# Patient Record
Sex: MALE | Race: WHITE | NOT HISPANIC OR LATINO | Employment: OTHER | RURAL
[De-identification: names, ages, dates, MRNs, and addresses within clinical notes are randomized per-mention and may not be internally consistent; named-entity substitution may affect disease eponyms.]

---

## 2020-11-25 ENCOUNTER — OFFICE VISIT (OUTPATIENT)
Dept: OTOLARYNGOLOGY | Facility: CLINIC | Age: 70
End: 2020-11-25

## 2020-11-25 VITALS — TEMPERATURE: 98.8 F | WEIGHT: 172 LBS | BODY MASS INDEX: 24.62 KG/M2 | HEIGHT: 70 IN

## 2020-11-25 DIAGNOSIS — H68.009 EUSTACHIAN CATARRH, UNSPECIFIED LATERALITY: ICD-10-CM

## 2020-11-25 DIAGNOSIS — L40.9 PSORIASIS: ICD-10-CM

## 2020-11-25 DIAGNOSIS — H61.23 BILATERAL IMPACTED CERUMEN: Primary | ICD-10-CM

## 2020-11-25 DIAGNOSIS — J34.2 NASAL SEPTAL DEVIATION: ICD-10-CM

## 2020-11-25 PROCEDURE — 99204 OFFICE O/P NEW MOD 45 MIN: CPT | Performed by: OTOLARYNGOLOGY

## 2020-11-25 PROCEDURE — 69210 REMOVE IMPACTED EAR WAX UNI: CPT | Performed by: OTOLARYNGOLOGY

## 2020-11-25 RX ORDER — OMEPRAZOLE 40 MG/1
40 CAPSULE, DELAYED RELEASE ORAL DAILY
COMMUNITY

## 2020-11-25 RX ORDER — LOSARTAN POTASSIUM 25 MG/1
25 TABLET ORAL DAILY
COMMUNITY
End: 2021-01-06 | Stop reason: ALTCHOICE

## 2020-11-25 RX ORDER — ASPIRIN 81 MG/1
81 TABLET ORAL DAILY
COMMUNITY

## 2020-11-25 RX ORDER — BETAMETHASONE DIPROPIONATE 0.5 MG/G
LOTION TOPICAL 2 TIMES DAILY
Qty: 60 ML | Refills: 6 | Status: SHIPPED | OUTPATIENT
Start: 2020-11-25 | End: 2020-11-25 | Stop reason: SDUPTHER

## 2020-11-25 RX ORDER — BETAMETHASONE DIPROPIONATE 0.5 MG/G
LOTION TOPICAL
Qty: 60 ML | Refills: 1 | Status: SHIPPED | OUTPATIENT
Start: 2020-11-25

## 2020-11-25 RX ORDER — MONTELUKAST SODIUM 10 MG/1
10 TABLET ORAL DAILY
COMMUNITY

## 2020-11-25 RX ORDER — OLOPATADINE HYDROCHLORIDE 665 UG/1
2 SPRAY NASAL 2 TIMES DAILY
Qty: 30 G | Refills: 6 | Status: SHIPPED | OUTPATIENT
Start: 2020-11-25 | End: 2022-01-14

## 2020-11-25 RX ORDER — SIMVASTATIN 20 MG
20 TABLET ORAL NIGHTLY
COMMUNITY

## 2020-11-25 NOTE — PATIENT INSTRUCTIONS
MyPlate from USDA    MyPlate is an outline of a general healthy diet based on the 2010 Dietary Guidelines for Americans, from the U.[place]. Department of Agriculture (USDA). It sets guidelines for how much food you should eat from each food group based on your age, sex, and level of physical activity.  What are tips for following MyPlate?  To follow MyPlate recommendations:  · Eat a wide variety of fruits and vegetables, grains, and protein foods.  · Serve smaller portions and eat less food throughout the day.  · Limit portion sizes to avoid overeating.  · Enjoy your food.  · Get at least 150 minutes of exercise every week. This is about 30 minutes each day, 5 or more days per week.  It can be difficult to have every meal look like MyPlate. Think about MyPlate as eating guidelines for an entire day, rather than each individual meal.  Fruits and vegetables  · Make half of your plate fruits and vegetables.  · Eat many different colors of fruits and vegetables each day.  · For a 2,000 calorie daily food plan, eat:  ? 2½ cups of vegetables every day.  ? 2 cups of fruit every day.  · 1 cup is equal to:  ? 1 cup raw or cooked vegetables.  ? 1 cup raw fruit.  ? 1 medium-sized orange, apple, or banana.  ? 1 cup 100% fruit or vegetable juice.  ? 2 cups raw leafy greens, such as lettuce, spinach, or kale.  ? ½ cup dried fruit.  Grains  · One fourth of your plate should be grains.  · Make at least half of the grains you eat each day whole grains.  · For a 2,000 calorie daily food plan, eat 6 oz of grains every day.  · 1 oz is equal to:  ? 1 slice bread.  ? 1 cup cereal.  ? ½ cup cooked rice, cereal, or pasta.  Protein  · One fourth of your plate should be protein.  · Eat a wide variety of protein foods, including meat, poultry, fish, eggs, beans, nuts, and tofu.  · For a 2,000 calorie daily food plan, eat 5½ oz of protein every day.  · 1 oz is equal to:  ? 1 oz meat, poultry, or fish.  ? ¼ cup cooked beans.  ? 1 egg.  ? ½ oz nuts  or seeds.  ? 1 Tbsp peanut butter.  Dairy  · Drink fat-free or low-fat (1%) milk.  · Eat or drink dairy as a side to meals.  · For a 2,000 calorie daily food plan, eat or drink 3 cups of dairy every day.  · 1 cup is equal to:  ? 1 cup milk, yogurt, cottage cheese, or soy milk (soy beverage).  ? 2 oz processed cheese.  ? 1½ oz natural cheese.  Fats, oils, salt, and sugars  · Only small amounts of oils are recommended.  · Avoid foods that are high in calories and low in nutritional value (empty calories), like foods high in fat or added sugars.  · Choose foods that are low in salt (sodium). Choose foods that have less than 140 milligrams (mg) of sodium per serving.  · Drink water instead of sugary drinks. Drink enough water each day to keep your urine pale yellow.  Where to find support  · Work with your health care provider or a nutrition specialist (dietitian) to develop a customized eating plan that is right for you.  · Download an pankaj (mobile application) to help you track your daily food intake.  Where to find more information  · Go to ChooseMyPlate.gov for more information.  Summary  · MyPlate is a general guideline for healthy eating from the USDA. It is based on the 2010 Dietary Guidelines for Americans.  · In general, fruits and vegetables should take up ½ of your plate, grains should take up ¼ of your plate, and protein should take up ¼ of your plate.  This information is not intended to replace advice given to you by your health care provider. Make sure you discuss any questions you have with your health care provider.  Document Revised: 05/21/2020 Document Reviewed: 03/19/2018  Elsevier Patient Education © 2020 [employer] Inc.

## 2020-11-25 NOTE — PROGRESS NOTES
Subjective   Beto Bhatia is a 70 y.o. male.   Multiple ear and nasal complaints  History of Present Illness   Patient says he has had every 2 to 3-month ear cleanings by multiple ENTs and is concerned about why that happens he is also having ear stopped up and popping crackling he has known allergy symptoms sees an allergist is never been treated for psoriasis or is had questions about his dental issues which are referred him to his dentist about has not had a recent CT of the sinuses though he has not passed takes multiple allergy medicines  He has mild hearing loss and popping fullness that comes and goes with his ears he says ears cleaned out suction and wax removed by multiple techniques    The following portions of the patient's history were reviewed and updated as appropriate: allergies, current medications, past family history, past medical history, past social history, past surgical history and problem list.      Beto Bhatia reports that he has quit smoking. He has never used smokeless tobacco. He reports previous alcohol use. He reports that he does not use drugs.  Patient is not a tobacco user and has been counseled for use of tobacco products    Family History   Problem Relation Age of Onset   • Cancer Other    • Heart disease Other          Current Outpatient Medications:   •  aspirin 81 MG EC tablet, Take 81 mg by mouth Daily., Disp: , Rfl:   •  losartan (COZAAR) 25 MG tablet, Take 25 mg by mouth Daily., Disp: , Rfl:   •  montelukast (Singulair) 10 MG tablet, Take 10 mg by mouth Daily., Disp: , Rfl:   •  Multiple Vitamin (MULTIVITAMINS PO), Take 1 tablet by mouth Daily., Disp: , Rfl:   •  omeprazole (priLOSEC) 40 MG capsule, Take 40 mg by mouth Daily. 11/25/2020 - Patient States He Utilizes Omeprazole 40 MG Tablets 3 Days Per Week At Current Time, Disp: , Rfl:   •  simvastatin (ZOCOR) 20 MG tablet, Take 20 mg by mouth Every Night., Disp: , Rfl:   •  betamethasone dipropionate  (DIPROLENE) 0.05 % lotion, Apply  topically to the appropriate area as directed 2 (Two) Times a Day., Disp: 60 mL, Rfl: 6  •  olopatadine (PATANASE) 0.6 % solution nasal solution, 2 sprays by Each Nare route 2 (Two) Times a Day., Disp: 30 g, Rfl: 6    No Known Allergies    Past Medical History:   Diagnosis Date   • Asthma    • Hypertension        History reviewed. No pertinent surgical history.    Review of Systems   Constitutional: Negative for fever.   HENT: Positive for congestion, dental problem, postnasal drip and rhinorrhea. Negative for ear discharge, ear pain and facial swelling.    Neurological: Negative for dizziness.   Hematological: Negative for adenopathy.   All other systems reviewed and are negative.          Objective   Physical Exam  Vitals signs and nursing note reviewed.   Constitutional:       Appearance: He is normal weight.   HENT:      Head: Normocephalic.      Right Ear: Tympanic membrane normal.      Left Ear: Tympanic membrane normal.      Ears:      Comments: Bilateral cerumen impaction right worse than left     Nose:      Comments: Deviated septum to the right with mild swelling the turbinates no pus polyps or blood     Mouth/Throat:      Pharynx: Oropharynx is clear.   Eyes:      Conjunctiva/sclera: Conjunctivae normal.   Pulmonary:      Effort: Pulmonary effort is normal.   Skin:     General: Skin is warm.   Neurological:      General: No focal deficit present.      Mental Status: He is alert.   Psychiatric:         Mood and Affect: Mood normal.           Procedure note bilateral cerumen impaction cleaned with multiple suctions and use of the microscope patient tolerated well there are no complications    Assessment/Plan   Diagnoses and all orders for this visit:    1. Bilateral impacted cerumen (Primary)    2. Psoriasis    3. Nasal septal deviation    4. Eustachian catarrh, unspecified laterality    Other orders  -     olopatadine (PATANASE) 0.6 % solution nasal solution; 2 sprays by  Each Nare route 2 (Two) Times a Day.  Dispense: 30 g; Refill: 6  -     betamethasone dipropionate (DIPROLENE) 0.05 % lotion; Apply  topically to the appropriate area as directed 2 (Two) Times a Day.  Dispense: 60 mL; Refill: 6    We had a long discussion about eustachian tube problems discussed psoriasis and how that could explain why his dead skin wax keeps building up so quickly our audiologist was not here today so I told him we would be glad to check his hearing and eustachian tube problems can be documented on follow-up  In the meantime we can try and treat the problem with Astelin nasal spray and placed him on low-dose betamethasone 1 to 2 drops in his ear  He is only do that 3 times a week for 2 weeks and then go to 2 times a week we will recheck in 1 month he is not to use it twice a day  As per the original instructions he understands and accepts this he is going to talk to his dentist about other issues explaining how to use medications or use and side effect all questions were answered

## 2020-11-30 ENCOUNTER — TELEPHONE (OUTPATIENT)
Dept: OTOLARYNGOLOGY | Facility: CLINIC | Age: 70
End: 2020-11-30

## 2020-11-30 NOTE — TELEPHONE ENCOUNTER
----- Message from Nirali Samaniego sent at 11/30/2020 11:04 AM CST -----  Contact: [phone]  Was seen last Wednesday and given rx for a cream. Pt needs to know how to use the cream.

## 2020-11-30 NOTE — TELEPHONE ENCOUNTER
11/30/2020, 1120 - Patient telephoned per this staff member [phone] with notification to place bottle of Diprolene 0.05% Lotion in warm water until lotion develops a more liquifed stated, lay on opposite side of affected ear, and place 3 - 5 drops of lotion into ear canal daily X 1 week, then 3 times per week X 1 week, then 2 times per week X 1 week.  Patient instructed to sway outer portion of ear back and forth to ensure lotion is directed down into ear canal.  Patient instructed he may place cotton ball in ear to gather any excess lotion that may drain from ear.  Patient given reminder of Audiogram scheduled  Wednesday, January 6 at 8:30 A.M. at [facility] followed by clinical appointment with Dr. Mtz at 9:00 A.M.  Patient verbalized understanding.

## 2021-01-06 ENCOUNTER — OFFICE VISIT (OUTPATIENT)
Dept: OTOLARYNGOLOGY | Facility: CLINIC | Age: 71
End: 2021-01-06

## 2021-01-06 ENCOUNTER — CLINICAL SUPPORT (OUTPATIENT)
Dept: AUDIOLOGY | Facility: CLINIC | Age: 71
End: 2021-01-06

## 2021-01-06 VITALS — WEIGHT: 176 LBS | TEMPERATURE: 98.6 F | BODY MASS INDEX: 25.2 KG/M2 | HEIGHT: 70 IN

## 2021-01-06 DIAGNOSIS — H90.3 SENSORINEURAL HEARING LOSS (SNHL) OF BOTH EARS: ICD-10-CM

## 2021-01-06 DIAGNOSIS — H90.3 SENSORINEURAL HEARING LOSS, BILATERAL: Primary | ICD-10-CM

## 2021-01-06 DIAGNOSIS — H93.11 TINNITUS OF RIGHT EAR: ICD-10-CM

## 2021-01-06 DIAGNOSIS — H68.009 EUSTACHIAN CATARRH, UNSPECIFIED LATERALITY: ICD-10-CM

## 2021-01-06 DIAGNOSIS — J34.2 NASAL SEPTAL DEVIATION: Primary | ICD-10-CM

## 2021-01-06 PROCEDURE — 99213 OFFICE O/P EST LOW 20 MIN: CPT | Performed by: OTOLARYNGOLOGY

## 2021-01-06 PROCEDURE — 92567 TYMPANOMETRY: CPT | Performed by: AUDIOLOGIST

## 2021-01-06 PROCEDURE — 92557 COMPREHENSIVE HEARING TEST: CPT | Performed by: AUDIOLOGIST

## 2021-01-06 RX ORDER — LOSARTAN POTASSIUM AND HYDROCHLOROTHIAZIDE 12.5; 5 MG/1; MG/1
1 TABLET ORAL DAILY
COMMUNITY
Start: 2021-01-03

## 2021-01-06 NOTE — PROGRESS NOTES
"STANDARD AUDIOMETRIC EVALUATION      Name:  Beto Bhatia  :  1950  Age:  70 y.o.  Date of Evaluation:  2021      HISTORY    Reason for visit:  Beto Bhatia is seen today for a hearing test at the request of Dr. Jordin Mtz.  Patient reports he has problems hearing.  He states he has been hearing ringing in his right ear for a few months.  Reportedly, he has a history of eustachian tube dysfunction and wax build up in his ears that has to be cleaned out.  He states sometimes he hears \"crackling\" sounds in his ears.      EVALUATION    See Audiogram    RESULTS        Otoscopy and Tympanometry 226 Hz :  Right Ear:  Otoscopy:  Clear ear canal          Tympanometry:  Middle ear function within normal limits    Left Ear:   Otoscopy:  Clear ear canal        Tympanometry:  Middle ear function within normal limits    Test technique:  Standard Audiometry     Pure Tone Audiometry:   Patient responded to pure tones at 10-70 dB for 250-8000 Hz in right ear, and at 5-70 dB for 250-8000 Hz in left ear.       Speech Audiometry:        Right Ear:  Speech Reception Threshold (SRT) was obtained at 20 dBHL                 Speech Discrimination scores were 40% in quiet when words were presented at 60 dBHL       Left Ear:  Speech Reception Threshold (SRT) was obtained at 10 dBHL                 Speech Discrimination scores were 48% in quiet when words were presented at 65 dBHL    Reliability:   good    IMPRESSIONS:  1.  Tympanometry results are consistent with Middle ear function within normal limits in both ears.  2.  Pure tone results are consistent with within normal limits to moderately severe sloping sensorineural hearing loss for both ears.       RECOMMENDATIONS:  Patient is seeing the Ear Nose and Throat physician immediately following this examination.  It was a pleasure seeing Beto Bhatia in Audiology today.  We would be happy to do further testing or discuss these test as " necessary.          This document has been electronically signed by Kassandra Pollock MS CCC-A on January 6, 2021 14:28 CST       Kassandra Pollock MS CCC-A  Licensed Audiologist

## 2021-01-06 NOTE — PROGRESS NOTES
Subjective   Beto Bhatia is a 70 y.o. male.   Ear problem    History of Present Illness   Patient says his nasal congestion drainage is doing much better and asked if he needs to use his nasal spray regularly  He has no new ear complaints has longstanding hearing loss and occasional tinnitus    The following portions of the patient's history were reviewed and updated as appropriate: allergies, current medications, past family history, past medical history, past social history, past surgical history and problem list.      Current Outpatient Medications:   •  aspirin 81 MG EC tablet, Take 81 mg by mouth Daily., Disp: , Rfl:   •  betamethasone dipropionate (DIPROLENE) 0.05 % lotion, Use drops once a day 3 times a week and then 2 times a week after 2 weeks, Disp: 60 mL, Rfl: 1  •  losartan-hydrochlorothiazide (HYZAAR) 50-12.5 MG per tablet, Take 1 tablet by mouth Daily., Disp: , Rfl:   •  montelukast (Singulair) 10 MG tablet, Take 10 mg by mouth Daily., Disp: , Rfl:   •  Multiple Vitamin (MULTIVITAMINS PO), Take 1 tablet by mouth Daily., Disp: , Rfl:   •  mupirocin (BACTROBAN) 2 % ointment, Apply  topically to the appropriate area as directed As Needed., Disp: , Rfl:   •  olopatadine (PATANASE) 0.6 % solution nasal solution, 2 sprays by Each Nare route 2 (Two) Times a Day., Disp: 30 g, Rfl: 6  •  omeprazole (priLOSEC) 40 MG capsule, Take 40 mg by mouth Daily. 11/25/2020 - Patient States He Utilizes Omeprazole 40 MG Tablets 3 Days Per Week At Current Time, Disp: , Rfl:   •  simvastatin (ZOCOR) 20 MG tablet, Take 20 mg by mouth Every Night., Disp: , Rfl:     No Known Allergies          Review of Systems   Constitutional: Negative for fever.   HENT: Positive for hearing loss. Negative for ear discharge and ear pain.    Hematological: Negative for adenopathy.           Objective   Physical Exam  Vitals signs and nursing note reviewed.   HENT:      Head: Normocephalic.      Right Ear: Tympanic membrane normal.       Left Ear: Tympanic membrane normal.      Ears:      Comments: Small amount of wax right ear canal removed with otomicroscope and suction  Eyes:      Conjunctiva/sclera: Conjunctivae normal.   Pulmonary:      Effort: Pulmonary effort is normal.   Skin:     General: Skin is warm.   Neurological:      General: No focal deficit present.      Mental Status: He is alert.   Psychiatric:         Mood and Affect: Mood normal.       Audiogram was reviewed with the patient actual tracing shown to him showing bilateral sensorineural hearing loss a copy was given to him and referral to audiology for hearing aid evaluation tympanograms are normal showing no evidence of fluid actual tracing shown to the patient      Assessment/Plan   Diagnoses and all orders for this visit:    1. Nasal septal deviation (Primary)    2. Eustachian catarrh, unspecified laterality    3. Sensorineural hearing loss (SNHL) of both ears    Hearing aid referral made he will see Chanelle Moore who he knows he is to avoid loud noises discuss strategies to minimize wax buildup follow-up with Dr. Hall 6 Months  Use nasal sprays as needed-patient has refills I reviewed the use and side effects with him in detail  Discuss strategies minimize wax buildup  Call if questions or problems

## 2021-07-09 ENCOUNTER — OFFICE VISIT (OUTPATIENT)
Dept: OTOLARYNGOLOGY | Facility: CLINIC | Age: 71
End: 2021-07-09

## 2021-07-09 VITALS — WEIGHT: 162 LBS | HEIGHT: 70 IN | OXYGEN SATURATION: 97 % | BODY MASS INDEX: 23.19 KG/M2

## 2021-07-09 DIAGNOSIS — J31.0 CHRONIC RHINITIS: Primary | ICD-10-CM

## 2021-07-09 DIAGNOSIS — J34.2 NASAL SEPTAL DEFORMITY: ICD-10-CM

## 2021-07-09 DIAGNOSIS — H61.21 IMPACTED CERUMEN OF RIGHT EAR: ICD-10-CM

## 2021-07-09 PROCEDURE — 99213 OFFICE O/P EST LOW 20 MIN: CPT | Performed by: OTOLARYNGOLOGY

## 2021-07-09 PROCEDURE — 69210 REMOVE IMPACTED EAR WAX UNI: CPT | Performed by: OTOLARYNGOLOGY

## 2021-07-09 NOTE — PROGRESS NOTES
Subjective   Beto Bhatia is a 70 y.o. male.       History of Present Illness   Former patient of Dr. Mtz followed with chronic rhinitis, nasal septal deformity, sensorineural hearing loss.  Reports his right ear feels stopped up.  He hears popping and cracking in his ears at times.  No otorrhea.  Is using Singulair and Patanase.      The following portions of the patient's history were reviewed and updated as appropriate: allergies, current medications, past family history, past medical history, past social history, past surgical history and problem list.     reports that he has quit smoking. He has never used smokeless tobacco. He reports previous alcohol use. He reports that he does not use drugs.   Patient is not a tobacco user and has not been counseled for use of tobacco products      Review of Systems        Objective   Physical Exam  Ears: External ears no deformity.  Right ear canal shows a cerumen impaction.Using the binocular microscope for visualization, cerumen impaction was removed from right ear canal(s) using instrumentation. This was personally performed by Toy Hall MD.  Following cerumen removal tympanic membrane is noted to be intact and clear.  Left ear shows some nonobstructing wax that is removed as a courtesy.  Beyond this tympanic membrane is intact and clear.  Nares: Boggy mucosa septum to the right no discharge or purulence  Oral cavity: Lips and gums without lesions.  Tongue and floor of mouth without lesions.  Parotid and submandibular ducts unobstructed.  No mucosal lesions on the buccal mucosa or vestibule of the mouth.  Pharynx: No erythema or exudate  Neck: No lymphadenopathy.  No thyromegaly.  Trachea and larynx midline.  No masses in the parotid or submandibular glands.      Assessment/Plan   Diagnoses and all orders for this visit:    1. Chronic rhinitis (Primary)    2. Impacted cerumen of right ear    3. Nasal septal deformity        Plan: Cerumen removed  as described above.  Continue nasal medications as prescribed by Dr. Mtz.  Return in 6 months with an audiogram, call sooner for problems.

## 2022-01-14 ENCOUNTER — CLINICAL SUPPORT (OUTPATIENT)
Dept: AUDIOLOGY | Facility: CLINIC | Age: 72
End: 2022-01-14

## 2022-01-14 ENCOUNTER — OFFICE VISIT (OUTPATIENT)
Dept: OTOLARYNGOLOGY | Facility: CLINIC | Age: 72
End: 2022-01-14

## 2022-01-14 VITALS — OXYGEN SATURATION: 95 % | HEIGHT: 70 IN | BODY MASS INDEX: 23.91 KG/M2 | HEART RATE: 69 BPM | WEIGHT: 167 LBS

## 2022-01-14 DIAGNOSIS — H90.3 SENSORINEURAL HEARING LOSS, BILATERAL: Primary | ICD-10-CM

## 2022-01-14 DIAGNOSIS — J34.2 NASAL SEPTAL DEFORMITY: ICD-10-CM

## 2022-01-14 DIAGNOSIS — H93.8X9 EAR POPPING, UNSPECIFIED LATERALITY: ICD-10-CM

## 2022-01-14 DIAGNOSIS — H90.3 SENSORINEURAL HEARING LOSS OF BOTH EARS: Primary | ICD-10-CM

## 2022-01-14 DIAGNOSIS — H61.21 EXCESSIVE CERUMEN IN RIGHT EAR CANAL: ICD-10-CM

## 2022-01-14 DIAGNOSIS — J31.0 CHRONIC RHINITIS: ICD-10-CM

## 2022-01-14 PROCEDURE — 99214 OFFICE O/P EST MOD 30 MIN: CPT | Performed by: OTOLARYNGOLOGY

## 2022-01-14 PROCEDURE — 92567 TYMPANOMETRY: CPT | Performed by: AUDIOLOGIST

## 2022-01-14 PROCEDURE — 92557 COMPREHENSIVE HEARING TEST: CPT | Performed by: AUDIOLOGIST

## 2022-01-14 RX ORDER — AZELASTINE 1 MG/ML
2 SPRAY, METERED NASAL 2 TIMES DAILY
Qty: 30 ML | Refills: 11 | Status: SHIPPED | OUTPATIENT
Start: 2022-01-14

## 2022-01-14 NOTE — PROGRESS NOTES
STANDARD AUDIOMETRIC EVALUATION      Name:  Beto Bhatia  :  1950  Age:  71 y.o.  Date of Evaluation:  2022      HISTORY    Reason for visit:  Beto Bhatia is seen today for a hearing test at the request of Dr. Toy Hall.  Patient has a history of bilateral sensorineural hearing loss.  He states the last time he was here, Dr Hall cleaned his ears out.  He states he hears popping in his ears, and he can hear better after he yawns.  He reports no changes in his hearing, and he does not have hearing aids.       EVALUATION    See Audiogram    RESULTS        Otoscopy and Tympanometry 226 Hz :  Right Ear:  Otoscopy:  Visible ear drum, some cerumen in ear canal          Tympanometry:  Middle ear function within normal limits    Left Ear:   Otoscopy:  Clear ear canal        Tympanometry:  Middle ear function within normal limits    Test technique:  Standard Audiometry     Pure Tone Audiometry:   Patient responded to pure tones at 15-75 dB for 250-8000 Hz in right ear, and at 10-70 dB for 250-8000 Hz in left ear.       Speech Audiometry:        Right Ear:  Speech Reception Threshold (SRT) was obtained at 25 dBHL                 Speech Discrimination scores were 36% in quiet when words were presented at 65 dBHL       Left Ear:  Speech Reception Threshold (SRT) was obtained at 20 dBHL                 Speech Discrimination scores were 56% in quiet when words were presented at 60 dBHL    Reliability:   good    IMPRESSIONS:  1.  Tympanometry results are consistent with Middle ear function within normal limits in both ears.  2.  Pure tone results are consistent with within normal limits to moderately severe sloping sensorineural hearing loss for both ears.       RECOMMENDATIONS:  Patient is seeing the Ear Nose and Throat physician immediately following this examination.  It was a pleasure seeing Beto Bhatia in Audiology today.  We would be happy to do further testing or discuss these  test as necessary.          This document has been electronically signed by Kassandra Pollock MS CCC-A on January 14, 2022 09:46 CST       Kassandra Pollock MS CCC-A  Licensed Audiologist

## 2022-01-14 NOTE — PROGRESS NOTES
Subjective   Beto Bhatia is a 71 y.o. male.       History of Present Illness   Patient is followed with chronic rhinitis rhinitis nasal septal deformity and bilateral sensorineural hearing loss.  Also has had trouble with wax in his ears.  Returns today stating he is using his Singulair daily.  He had previously been prescribed Patanase nose spray.  He does not recall this and is not using it.  He had some Flonase at home that he uses as needed.  Feels like his hearing is relatively stable.  Says his wife still complains that he does not hear her well      The following portions of the patient's history were reviewed and updated as appropriate: allergies, current medications, past family history, past medical history, past social history, past surgical history and problem list.     reports that he has quit smoking. He has never used smokeless tobacco. He reports previous alcohol use. He reports that he does not use drugs.   Patient is not a tobacco user and has not been counseled for use of tobacco products      Review of Systems        Objective   Physical Exam  Ears: External ears no deformity.  Both ear canals show some partially obstructing wax is removed under the microscope to facilitate visualization and tympanic membranes are noted to be intact clear and mobile  Nares: Pale boggy mucosa with septum to the right no discharge or purulence  Oral cavity: No masses or lesions  Pharynx: No erythema or exudate  Neck: No adenopathy or mass  Audiogram is obtained and reviewed and shows bilateral symmetrical normal sloping to moderately severe sensorineural hearing loss with type a tympanograms bilaterally.  Discrimination scores are 36% on the right 56% on the left      Assessment/Plan   Diagnoses and all orders for this visit:    1. Sensorineural hearing loss of both ears (Primary)    2. Chronic rhinitis    3. Nasal septal deformity    Other orders  -     azelastine (ASTELIN) 0.1 % nasal spray; 2 sprays  into the nostril(s) as directed by provider 2 (Two) Times a Day. Use in each nostril as directed  Dispense: 30 mL; Refill: 11      Plan: Told the patient I would recommend Astelin for as needed nasal use that Flonase is not appropriate for as needed use.  He is agreeable and this will be sent to his pharmacy.  He is to continue his Singulair daily.  Ears cleaned as described above.  Told the patient he could consider amplification if he so desires.  Return in 6 months to reevaluate his nose and see if his ears need cleaning.  Plan on repeating audiogram in a year